# Patient Record
(demographics unavailable — no encounter records)

---

## 2025-02-12 NOTE — ASSESSMENT
[FreeTextEntry1] : Impression: Onychomycosis, painful (B35.1) (W57.401).    Treatment:  Advised patient to continue using the topical Ketoconazole or over-the-counter antifungals on the nails.   Bilateral hallux nails were debrided of excessive thickness and length to appropriate levels of comfort and contour using sterile nippers and Dremel.   The rest of the digits were trimmed to hygienic lengths.  The procedure was tolerated well without complications.   Return: 3 months or as needed.

## 2025-02-12 NOTE — HISTORY OF PRESENT ILLNESS
[FreeTextEntry1] : Patient returns today with family for follow up of onychomycotic nails.  Denies any new medical changes.  Denies any redness, swelling or drainage.  Patient has been using topical Ketoconazole on the skin and nails; however, inconsistently.  Patient is currently visiting family in [place] from another [facility].

## 2025-02-12 NOTE — PHYSICAL EXAM
[Varicose Veins Of Lower Extremities] : bilaterally [Ankle Swelling On The Right] : mild [2+] : left foot dorsalis pedis 2+ [No Focal Deficits] : no focal deficits [FreeTextEntry3] : CFT: < 3 seconds x 10.  Temperature gradient: warm to cool.  [de-identified] : ROM of all pedal joints intact, non-painful, non-crepitant, without any restrictions.  Muscle power: 5/5, all pedal groups.   [FreeTextEntry1] :  Epicritic sensations intact, bilateral.

## 2025-02-12 NOTE — ASSESSMENT
[FreeTextEntry1] : Impression: Onychomycosis, painful (B35.1) (A18.369).    Treatment:  Advised patient to continue using the topical Ketoconazole or over-the-counter antifungals on the nails.   Bilateral hallux nails were debrided of excessive thickness and length to appropriate levels of comfort and contour using sterile nippers and Dremel.   The rest of the digits were trimmed to hygienic lengths.  The procedure was tolerated well without complications.   Return: 3 months or as needed.

## 2025-02-12 NOTE — PHYSICAL EXAM
[Varicose Veins Of Lower Extremities] : bilaterally [Ankle Swelling On The Right] : mild [2+] : left foot dorsalis pedis 2+ [No Focal Deficits] : no focal deficits [FreeTextEntry3] : CFT: < 3 seconds x 10.  Temperature gradient: warm to cool.  [de-identified] : ROM of all pedal joints intact, non-painful, non-crepitant, without any restrictions.  Muscle power: 5/5, all pedal groups.   [FreeTextEntry1] :  Epicritic sensations intact, bilateral.

## 2025-02-12 NOTE — ASSESSMENT
[FreeTextEntry1] : Impression: Onychomycosis, painful (B35.1) (B47.614).    Treatment:  Advised patient to continue using the topical Ketoconazole or over-the-counter antifungals on the nails.   Bilateral hallux nails were debrided of excessive thickness and length to appropriate levels of comfort and contour using sterile nippers and Dremel.   The rest of the digits were trimmed to hygienic lengths.  The procedure was tolerated well without complications.   Return: 3 months or as needed.

## 2025-02-12 NOTE — HISTORY OF PRESENT ILLNESS
[FreeTextEntry1] : Patient returns today with family for follow up of onychomycotic nails.  Denies any new medical changes.  Denies any redness, swelling or drainage.  Patient has been using topical Ketoconazole on the skin and nails; however, inconsistently.  Patient is currently visiting family in [place] from another [facility].

## 2025-02-12 NOTE — HISTORY OF PRESENT ILLNESS
[FreeTextEntry1] : Patient returns today with family for follow up of onychomycotic nails.  Denies any new medical changes.  Denies any redness, swelling or drainage.  Patient has been using topical Ketoconazole on the skin and nails; however, inconsistently.  Patient is currently visiting family in [place] from another [facility].

## 2025-02-12 NOTE — PHYSICAL EXAM
[Varicose Veins Of Lower Extremities] : bilaterally [Ankle Swelling On The Right] : mild [2+] : left foot dorsalis pedis 2+ [No Focal Deficits] : no focal deficits [FreeTextEntry3] : CFT: < 3 seconds x 10.  Temperature gradient: warm to cool.  [de-identified] : ROM of all pedal joints intact, non-painful, non-crepitant, without any restrictions.  Muscle power: 5/5, all pedal groups.   [FreeTextEntry1] :  Epicritic sensations intact, bilateral.